# Patient Record
Sex: FEMALE | Race: WHITE | NOT HISPANIC OR LATINO | Employment: FULL TIME | URBAN - METROPOLITAN AREA
[De-identification: names, ages, dates, MRNs, and addresses within clinical notes are randomized per-mention and may not be internally consistent; named-entity substitution may affect disease eponyms.]

---

## 2017-04-24 ENCOUNTER — TELEPHONE (OUTPATIENT)
Dept: OBSTETRICS AND GYNECOLOGY | Age: 32
End: 2017-04-24

## 2017-04-24 NOTE — TELEPHONE ENCOUNTER
Pt with painful menses, she is wanting treatment. Her maternal aunt had DVT and pt had migraines so would avoid estrogen containing ocps.but progesterone only medications would be acceptable. Patient's sister has been happy with Nexplanon, pt does not want IUD. Patient also wants contraception. Discussed options and pt desires Nexplanon. She will call in am to get prior auth and schedule placement.

## 2017-08-18 ENCOUNTER — OFFICE VISIT (OUTPATIENT)
Dept: OBSTETRICS AND GYNECOLOGY | Age: 32
End: 2017-08-18

## 2017-08-18 VITALS
BODY MASS INDEX: 25.46 KG/M2 | WEIGHT: 168 LBS | HEIGHT: 68 IN | DIASTOLIC BLOOD PRESSURE: 80 MMHG | SYSTOLIC BLOOD PRESSURE: 110 MMHG

## 2017-08-18 DIAGNOSIS — Z30.017 NEXPLANON INSERTION: Primary | ICD-10-CM

## 2017-08-18 LAB
B-HCG UR QL: NEGATIVE
INTERNAL NEGATIVE CONTROL: NEGATIVE
INTERNAL POSITIVE CONTROL: POSITIVE
Lab: NORMAL

## 2017-08-18 PROCEDURE — 81025 URINE PREGNANCY TEST: CPT | Performed by: NURSE PRACTITIONER

## 2017-08-18 PROCEDURE — 11981 INSERTION DRUG DLVR IMPLANT: CPT | Performed by: NURSE PRACTITIONER

## 2017-08-18 RX ORDER — ETONOGESTREL 68 MG/1
IMPLANT SUBCUTANEOUS
COMMUNITY
Start: 2017-08-03 | End: 2018-03-28 | Stop reason: ALTCHOICE

## 2017-08-18 RX ORDER — LORAZEPAM 1 MG/1
TABLET ORAL
COMMUNITY
Start: 2017-07-01 | End: 2018-03-28 | Stop reason: ALTCHOICE

## 2017-08-18 NOTE — PROGRESS NOTES
Nexplanon Insertion    Patient's last menstrual period was 08/16/2017.  Patient here for nexplanon insertion.  She has a history of migraines and needs a progesterone only birth control.  R/B/A discussed and bleeding patterns and potential mood changes reviewed. She denies any unprotected IC or chance of pregnancy.    Date of procedure:  8/18/2017    Risks and benefits discussed? yes  All questions answered? yes  Consents given by the patient  Written consent obtained? yes    Local anesthesia used:  yes    Procedure documentation:     Urine pregnancy test was done today and result was negative.  The risks (including infection,  bruising, irregular bleeding, pain at insertion site, and injury to muscles, nerves  and blood vessesl) and benefits of the procedure were explained to the patient  and/or guardian and Written informed consent was obtained. She especially  understands that her menstrual periods are expected to become irregular and  unpredictable throughout the time she is using the implant.  She has no  contraindications to the insertion.  Her questions have been answered.  She has  fully reviewed the FDA-approved consent brochure, has signed the consent  form, and wishes to proceed with the insertion today.     The upper left arm (non-dominant) was marked at the intended site of insertion.  Betadine was used to cleanse the skin.  1 % lidocaine without epinephrine was injected.  The Nexplanon was placed subdermally without difficulty and according to manufacturers instructions.  The device was able to be palpated in the arm by both myself and the patient.  Steri-strips were then placed across the site of insertion and a pressure bandage was applied.    She tolerated the procedure well.  There were no complications.  EBL was minimal.    Post procedure instructions:          The patient was advised to call for any rash, arm pain, fever, warmth or for prolonged bruising or bleeding. Remove the wrapping in 24  hours and the steri-strips in 5 days.    Follow up needed: PRN.  Discussed bleeding patterns and what to expect.  She will use back up x 1 month.

## 2017-08-19 NOTE — PROGRESS NOTES
I have reviewed the notes, assessments, and/or procedures performed by DALY ROBLES, I concur with her/his documentation of Radha Ferrer.

## 2018-02-01 ENCOUNTER — OFFICE VISIT (OUTPATIENT)
Dept: OBSTETRICS AND GYNECOLOGY | Age: 33
End: 2018-02-01

## 2018-02-01 VITALS
HEIGHT: 68 IN | SYSTOLIC BLOOD PRESSURE: 120 MMHG | DIASTOLIC BLOOD PRESSURE: 80 MMHG | WEIGHT: 180 LBS | BODY MASS INDEX: 27.28 KG/M2

## 2018-02-01 DIAGNOSIS — Z30.46 NEXPLANON REMOVAL: Primary | ICD-10-CM

## 2018-02-01 PROCEDURE — 11982 REMOVE DRUG IMPLANT DEVICE: CPT | Performed by: NURSE PRACTITIONER

## 2018-02-01 NOTE — PROGRESS NOTES
Nexplanon Removal    Date of procedure:  2/1/2018 Patient here for nexplanon removal.  She has noticed a big difference in her depression since insertion and she wants it removed. She has a doctor that manages her antidepressants and she sees a counselor. She has not changed anything else.  She has not been sexually active in over a year and denies the need for additional birth control at this time.  Her daughter is doing very well despite her heart defect!    Risks and benefits discussed? yes  All questions answered? yes  Consents given by the patient  Written consent obtained? yes    Local anesthesia used:  yes - 2 cc's of  Meds; anesthesia local: 1% lidocaine with epinephrine    Procedure documentation:    The upper left arm (non-dominant) was marked at the intended site of removal.  Betadine was used to cleanse the skin.  Local anesthesia was injected.  A vertical incision was created at the distal tip of the implant.  The implant was removed intact without difficulty.  Steri-strips were then placed across the site of insertion and the arm was wrapped.    She tolerated the procedure well.  There were no complications.  EBL was minimal.    Post procedure instructions: Remove the wrapping in 24 hours and the steri-strips in 5 days.    Follow up needed: PRN and AE.  Advised condoms if sexually active.

## 2018-02-04 NOTE — PROGRESS NOTES
I have reviewed the notes, assessments, and/or procedures performed by MARGARET Stewart, I concur with her/his documentation of Radha Ferrer.

## 2018-02-28 ENCOUNTER — OFFICE VISIT (OUTPATIENT)
Dept: OBSTETRICS AND GYNECOLOGY | Age: 33
End: 2018-02-28

## 2018-02-28 VITALS
WEIGHT: 183 LBS | HEIGHT: 65 IN | BODY MASS INDEX: 30.49 KG/M2 | SYSTOLIC BLOOD PRESSURE: 106 MMHG | DIASTOLIC BLOOD PRESSURE: 66 MMHG

## 2018-02-28 DIAGNOSIS — Z01.419 VISIT FOR GYNECOLOGIC EXAMINATION: Primary | ICD-10-CM

## 2018-02-28 DIAGNOSIS — Z86.010 HISTORY OF COLON POLYPS: ICD-10-CM

## 2018-02-28 DIAGNOSIS — Z11.51 SCREENING FOR HPV (HUMAN PAPILLOMAVIRUS): ICD-10-CM

## 2018-02-28 DIAGNOSIS — Z86.32 HISTORY OF GESTATIONAL DIABETES MELLITUS (GDM): ICD-10-CM

## 2018-02-28 DIAGNOSIS — Z13.29 SCREENING FOR THYROID DISORDER: ICD-10-CM

## 2018-02-28 DIAGNOSIS — R00.0 TACHYCARDIA: ICD-10-CM

## 2018-02-28 LAB
HBA1C MFR BLD: 4.9 % (ref 4.8–5.6)
T4 FREE SERPL-MCNC: 0.96 NG/DL (ref 0.93–1.7)
TSH SERPL DL<=0.005 MIU/L-ACNC: 1.46 MIU/ML (ref 0.27–4.2)

## 2018-02-28 PROCEDURE — 99395 PREV VISIT EST AGE 18-39: CPT | Performed by: OBSTETRICS & GYNECOLOGY

## 2018-02-28 NOTE — PROGRESS NOTES
Subjective     Chief Complaint   Patient presents with   • Annual Exam       History of Present Illness    Radha Ferrer is a 32 y.o.  who presents for annual exam.  Pt has not had menses since Nexplanon removed. She had it removed for weight gain and irregular bleeding  She and spouse are not sexually active. This has been chronic for them; they have not been active since her daughter was born. They are having issues and she is seeing a therapist.  Pt has noted an increase in heart rate. Her baseline rate has been in the 90's but up to 160 at times. She would like her thyroid tested.  Her daughter is doing well since her heart surgery   Obstetric History:  OB History      Para Term  AB Living    1 1 1 0 0 1    SAB TAB Ectopic Multiple Live Births    0 0 0 0          Menstrual History:     No LMP recorded. Patient has had an implant.         Current contraception: abstinence  History of abnormal Pap smear: no  Received Gardasil immunization: no  Perform regular self breast exam: yes - occ  Family history of uterine or ovarian cancer: no  Family History of colon cancer: yes - mat great uncleX 2  Family history of breast cancer: yes - mat great gm    Mammogram: not indicated.  Colonoscopy: recommended.  DEXA: not indicated.    Exercise: moderately active  Calcium/Vitamin D: adequate intake    The following portions of the patient's history were reviewed and updated as appropriate: allergies, current medications, past family history, past medical history, past social history, past surgical history and problem list.    Review of Systems    Review of Systems   Constitutional: Negative for fatigue.   Respiratory: Negative for shortness of breath.    Gastrointestinal: Negative for abdominal pain.   Genitourinary: Negative for dysuria. positive for abnormal bleeding on nexplanon  Neurological: Negative for headaches.   Psychiatric/Behavioral: Negative for dysphoric mood.  Cardiovascular: positive for  "tachycardia         Objective   Physical Exam    /66  Ht 165.1 cm (65\")  Wt 83 kg (183 lb)  BMI 30.45 kg/m2    General:   alert, appears stated age, cooperative and no distress   Neck: no asymmetry, masses, or scars and thyroid normal to palpation   Heart: regular rate and rhythm, S1, S2 normal, no murmur, click, rub or gallop   Lungs: clear to auscultation bilaterally   Abdomen: soft, non-tender, without masses or organomegaly   Breast: inspection negative, no nipple discharge or bleeding, no masses or nodularity palpable and no axillary adenopathy   Vulva: normal, Bartholin's, Urethra, [place]'s normal   Vagina: normal mucosa, normal discharge   Cervix: no lesions and pap done   Uterus: normal size, mobile, non-tender, normal shape and consistency   Adnexa: normal adnexa and no mass, fullness, tenderness   Rectal: not indicated     Assessment/Plan   Radha was seen today for annual exam.    Diagnoses and all orders for this visit:    Visit for gynecologic examination  -     IGP, Apt HPV,rfx 16 / 18,45 - ThinPrep Vial, Cervix    History of colon polyps  -     Ambulatory Referral to Gastroenterology    Tachycardia  -     TSH  -     T4, Free  -     Ambulatory Referral to Cardiology    Screening for thyroid disorder  -     TSH  -     T4, Free    Screening for HPV (human papillomavirus)  -     IGP, Apt HPV,rfx 16 / 18,45 - ThinPrep Vial, Cervix    History of gestational diabetes mellitus (GDM)  -     Hemoglobin A1c        All questions answered.  Breast self exam technique reviewed and patient encouraged to perform self-exam monthly.  Discussed healthy lifestyle modifications.  Recommended 30 minutes of aerobic exercise five times per week.    Recommend GI referral due to hx colon polyps, also referred to cardiology due to increased heart rate    Pt has not had menses since having Nexplanon removed last month, plan observe, pt not sexually active and does not need contraception, will follow, plan f/u visit 6 " weeks

## 2018-03-05 ENCOUNTER — TELEPHONE (OUTPATIENT)
Dept: OBSTETRICS AND GYNECOLOGY | Age: 33
End: 2018-03-05

## 2018-03-05 LAB
CYTOLOGIST CVX/VAG CYTO: NORMAL
CYTOLOGY CVX/VAG DOC THIN PREP: NORMAL
DX ICD CODE: NORMAL
HIV 1 & 2 AB SER-IMP: NORMAL
HPV I/H RISK 4 DNA CVX QL PROBE+SIG AMP: NEGATIVE
OTHER STN SPEC: NORMAL
PATH REPORT.FINAL DX SPEC: NORMAL
STAT OF ADQ CVX/VAG CYTO-IMP: NORMAL

## 2018-03-05 NOTE — TELEPHONE ENCOUNTER
----- Message from Mya Lovelace MD sent at 3/2/2018 11:45 AM EST -----  Call Radha, her thyroid labs and hgbA1c are normal

## 2018-03-05 NOTE — TELEPHONE ENCOUNTER
----- Message from Mya Lovelace MD sent at 3/5/2018  9:01 AM EST -----  Call Radha, pap and hpv are negative

## 2018-03-28 ENCOUNTER — OFFICE VISIT (OUTPATIENT)
Dept: CARDIOLOGY | Facility: CLINIC | Age: 33
End: 2018-03-28

## 2018-03-28 VITALS
HEIGHT: 69 IN | HEART RATE: 95 BPM | DIASTOLIC BLOOD PRESSURE: 88 MMHG | WEIGHT: 176 LBS | BODY MASS INDEX: 26.07 KG/M2 | SYSTOLIC BLOOD PRESSURE: 138 MMHG

## 2018-03-28 DIAGNOSIS — R00.2 PALPITATIONS: Primary | ICD-10-CM

## 2018-03-28 PROCEDURE — 99204 OFFICE O/P NEW MOD 45 MIN: CPT | Performed by: INTERNAL MEDICINE

## 2018-03-28 PROCEDURE — 93000 ELECTROCARDIOGRAM COMPLETE: CPT | Performed by: INTERNAL MEDICINE

## 2018-03-28 NOTE — PROGRESS NOTES
Subjective:     Encounter Date:03/28/2018      Patient ID: Radha Ferrer is a 32 y.o. female.    Chief Complaint:  History of Present Illness    This is a 32-year-old with a history of anxiety who presents for an evaluation of palpitations.    The patient reports that she's been having issues with palpitations for the last couple years but they have been a little bit worse in frequency recently.  She describes it as feeling like her heart suddenly starts racing at rest.  The symptoms came back arteries have an the day.  He does not generally occur with exertion or activity.  Symptoms can last several minutes.  The episodes are associated with shortness of breath, lightheadedness, and some chest discomfort.  She exercises on a pre-regular basis and does not note any of the symptoms with exercise.  She has she notes on her feet that that her heart rate will rise up to the 140s to 160s during these episodes.  She reports that the palpitations usually start suddenly and stop suddenly.  The episodes do occur on a daily basis.  She has been under a great deal of stress over the last few years especially over the last couple years.  Her daughter who is now 2 years old was born with Ebstein anomaly and had to undergo surgery within the first year of her life.  She was quite sick prior to the surgery requiring placement on ECMO.  Fortunately she has been doing well recently.  Additionally the patient reports that she and her  are going through a divorce, although amicable has been difficult.  She knows that she has issues with anxiety and does take alprazolam as needed and is followed by psychiatrist.  She is well aware that her symptoms could be due to her anxiety but wants to be sure there is not a more concerning cardiac issue before she focuses on stress management.    Review of Systems   Constitution: Negative for weakness and malaise/fatigue.   HENT: Negative for hearing loss, hoarse voice, nosebleeds and  sore throat.    Eyes: Negative for pain.   Cardiovascular: Positive for palpitations. Negative for chest pain, claudication, cyanosis, dyspnea on exertion, irregular heartbeat, leg swelling, near-syncope, orthopnea, paroxysmal nocturnal dyspnea and syncope.   Respiratory: Positive for shortness of breath. Negative for snoring.    Endocrine: Negative for cold intolerance, heat intolerance, polydipsia, polyphagia and polyuria.   Skin: Negative for itching and rash.   Musculoskeletal: Negative for arthritis, falls, joint pain, joint swelling, muscle cramps, muscle weakness and myalgias.   Gastrointestinal: Negative for constipation, diarrhea, dysphagia, heartburn, hematemesis, hematochezia, melena, nausea and vomiting.   Genitourinary: Negative for frequency, hematuria and hesitancy.   Neurological: Negative for excessive daytime sleepiness, dizziness, headaches, light-headedness and numbness.   Psychiatric/Behavioral: Negative for depression. The patient is nervous/anxious.           Current Outpatient Prescriptions:   •  ALPRAZolam (XANAX) 1 MG tablet, Take 1 mg by mouth 2 (Two) Times a Day As Needed for anxiety., Disp: , Rfl:   •  DULoxetine (CYMBALTA) 60 MG capsule, Take 60 mg by mouth Daily., Disp: , Rfl:     Past Medical History:   Diagnosis Date   • Anxiety    • Colon polyps    • Depression    • Diabetes    • GERD (gastroesophageal reflux disease)    • Gestational diabetes    • Kidney stones    • Multiple gastric ulcers    • Nipple discharge    • Tachycardia      Past Surgical History:   Procedure Laterality Date   •  SECTION     • CHOLECYSTECTOMY     • COLONOSCOPY W/ POLYPECTOMY      precancerous polyps     Family History   Problem Relation Age of Onset   • Brain cancer Mother    • Birth defects Daughter    • Colon cancer Other    • Breast cancer Other    • Colon cancer Other      Social History   Substance Use Topics   • Smoking status: Never Smoker   • Smokeless tobacco: Never Used   • Alcohol use 1.2  "oz/week     2 Cans of beer per week      Comment: social           ECG 12 Lead  Date/Time: 3/28/2018 5:09 PM  Performed by: VITALY HUNG  Authorized by: VITALY HUNG   Comparison: compared with previous ECG   Similar to previous ECG  Rhythm: sinus rhythm               Objective:         Visit Vitals  /88 (BP Location: Right arm, Patient Position: Sitting) Comment: lft 140 90   Pulse 95   Ht 175.3 cm (69\")   Wt 79.8 kg (176 lb)   BMI 25.99 kg/m²          Physical Exam   Constitutional: She is oriented to person, place, and time. She appears well-developed and well-nourished.   HENT:   Head: Normocephalic and atraumatic.   Eyes: Conjunctivae, EOM and lids are normal. Pupils are equal, round, and reactive to light.   Neck: Normal range of motion and full passive range of motion without pain. Neck supple. No JVD present. Carotid bruit is not present.   Cardiovascular: Normal rate, regular rhythm, S1 normal and S2 normal.  Exam reveals no gallop.    No murmur heard.  Pulses:       Radial pulses are 2+ on the right side, and 2+ on the left side.   No bilateral lower extremity edema   Pulmonary/Chest: Effort normal and breath sounds normal.   Abdominal: Soft. Normal appearance.   Lymphadenopathy:     She has no cervical adenopathy.   Neurological: She is alert and oriented to person, place, and time.   Skin: Skin is warm, dry and intact.   Psychiatric: She has a normal mood and affect.       Lab Review:       Assessment:          Diagnosis Plan   1. Palpitations  Holter Monitor - 24 Hour    Adult Transthoracic Echo Complete W/ Cont if Necessary Per Protocol          Plan:       1.  Palpitations.  Her symptoms could be simply sinus tachycardia although it is also suggestive of supraventricular tachycardia.  We'll go and get her set up with a 24-hour Holter monitor.  Additionally we'll pursue an echocardiogram to evaluate for any structural heart disease.  2.  Anxiety    Will call and discuss the results of the " echocardiogram and Holter monitor and determine further workup, management, and follow-up based on those results.

## 2018-03-30 ENCOUNTER — AMBULATORY SURGICAL CENTER (OUTPATIENT)
Dept: URBAN - METROPOLITAN AREA SURGERY 20 | Facility: SURGERY | Age: 33
End: 2018-03-30

## 2018-03-30 ENCOUNTER — OFFICE (OUTPATIENT)
Dept: URBAN - METROPOLITAN AREA PATHOLOGY 4 | Facility: PATHOLOGY | Age: 33
End: 2018-03-30

## 2018-03-30 DIAGNOSIS — K29.50 UNSPECIFIED CHRONIC GASTRITIS WITHOUT BLEEDING: ICD-10-CM

## 2018-03-30 DIAGNOSIS — D12.6 BENIGN NEOPLASM OF COLON, UNSPECIFIED: ICD-10-CM

## 2018-03-30 DIAGNOSIS — R19.7 DIARRHEA, UNSPECIFIED: ICD-10-CM

## 2018-03-30 DIAGNOSIS — K21.9 GASTRO-ESOPHAGEAL REFLUX DISEASE WITHOUT ESOPHAGITIS: ICD-10-CM

## 2018-03-30 DIAGNOSIS — D12.8 BENIGN NEOPLASM OF RECTUM: ICD-10-CM

## 2018-03-30 DIAGNOSIS — D12.0 BENIGN NEOPLASM OF CECUM: ICD-10-CM

## 2018-03-30 DIAGNOSIS — K29.70 GASTRITIS, UNSPECIFIED, WITHOUT BLEEDING: ICD-10-CM

## 2018-03-30 DIAGNOSIS — Z86.010 PERSONAL HISTORY OF COLONIC POLYPS: ICD-10-CM

## 2018-03-30 DIAGNOSIS — K31.89 OTHER DISEASES OF STOMACH AND DUODENUM: ICD-10-CM

## 2018-03-30 PROBLEM — K62.1 RECTAL POLYP: Status: ACTIVE | Noted: 2018-03-30

## 2018-03-30 LAB
GI HISTOLOGY: A. SELECT: (no result)
GI HISTOLOGY: B. SELECT: (no result)
GI HISTOLOGY: C. SELECT: (no result)
GI HISTOLOGY: D. SELECT: (no result)
GI HISTOLOGY: E. SELECT: (no result)
GI HISTOLOGY: PDF REPORT: (no result)

## 2018-03-30 PROCEDURE — 45380 COLONOSCOPY AND BIOPSY: CPT | Mod: 33 | Performed by: INTERNAL MEDICINE

## 2018-03-30 PROCEDURE — 43239 EGD BIOPSY SINGLE/MULTIPLE: CPT | Mod: 59 | Performed by: INTERNAL MEDICINE

## 2018-03-30 PROCEDURE — 88305 TISSUE EXAM BY PATHOLOGIST: CPT | Performed by: INTERNAL MEDICINE

## 2018-04-05 ENCOUNTER — HOSPITAL ENCOUNTER (OUTPATIENT)
Dept: CARDIOLOGY | Facility: HOSPITAL | Age: 33
Discharge: HOME | End: 2018-04-05
Attending: INTERNAL MEDICINE | Admitting: INTERNAL MEDICINE

## 2018-04-05 VITALS
DIASTOLIC BLOOD PRESSURE: 90 MMHG | WEIGHT: 176 LBS | HEART RATE: 88 BPM | HEIGHT: 69 IN | BODY MASS INDEX: 26.07 KG/M2 | SYSTOLIC BLOOD PRESSURE: 138 MMHG

## 2018-04-05 LAB
ASCENDING AORTA: 2.8 CM
BH CV ECHO MEAS - ACS: 1.9 CM
BH CV ECHO MEAS - AO MAX PG (FULL): 2.4 MMHG
BH CV ECHO MEAS - AO MAX PG: 6.7 MMHG
BH CV ECHO MEAS - AO MEAN PG (FULL): 1 MMHG
BH CV ECHO MEAS - AO MEAN PG: 3 MMHG
BH CV ECHO MEAS - AO ROOT AREA (BSA CORRECTED): 1.5
BH CV ECHO MEAS - AO ROOT AREA: 6.6 CM^2
BH CV ECHO MEAS - AO ROOT DIAM: 2.9 CM
BH CV ECHO MEAS - AO V2 MAX: 129 CM/SEC
BH CV ECHO MEAS - AO V2 MEAN: 76 CM/SEC
BH CV ECHO MEAS - AO V2 VTI: 24.8 CM
BH CV ECHO MEAS - ASC AORTA: 2.8 CM
BH CV ECHO MEAS - AVA(I,A): 1.8 CM^2
BH CV ECHO MEAS - AVA(I,D): 1.8 CM^2
BH CV ECHO MEAS - AVA(V,A): 2.2 CM^2
BH CV ECHO MEAS - AVA(V,D): 2.2 CM^2
BH CV ECHO MEAS - BSA(HAYCOCK): 2 M^2
BH CV ECHO MEAS - BSA: 2 M^2
BH CV ECHO MEAS - BZI_BMI: 26 KILOGRAMS/M^2
BH CV ECHO MEAS - BZI_METRIC_HEIGHT: 175.3 CM
BH CV ECHO MEAS - BZI_METRIC_WEIGHT: 79.8 KG
BH CV ECHO MEAS - CONTRAST EF (2CH): 63.1 ML/M^2
BH CV ECHO MEAS - CONTRAST EF 4CH: 58.3 ML/M^2
BH CV ECHO MEAS - EDV(MOD-SP2): 65 ML
BH CV ECHO MEAS - EDV(MOD-SP4): 72 ML
BH CV ECHO MEAS - EDV(TEICH): 78 ML
BH CV ECHO MEAS - EF(CUBED): 83.6 %
BH CV ECHO MEAS - EF(MOD-SP2): 63.1 %
BH CV ECHO MEAS - EF(MOD-SP4): 58.3 %
BH CV ECHO MEAS - EF(TEICH): 77 %
BH CV ECHO MEAS - ESV(MOD-SP2): 24 ML
BH CV ECHO MEAS - ESV(MOD-SP4): 30 ML
BH CV ECHO MEAS - ESV(TEICH): 17.9 ML
BH CV ECHO MEAS - FS: 45.3 %
BH CV ECHO MEAS - IVS/LVPW: 0.96
BH CV ECHO MEAS - IVSD: 0.86 CM
BH CV ECHO MEAS - LAT PEAK E' VEL: 12 CM/SEC
BH CV ECHO MEAS - LV DIASTOLIC VOL/BSA (35-75): 36.8 ML/M^2
BH CV ECHO MEAS - LV MASS(C)D: 114 GRAMS
BH CV ECHO MEAS - LV MASS(C)DI: 58.3 GRAMS/M^2
BH CV ECHO MEAS - LV MAX PG: 4.2 MMHG
BH CV ECHO MEAS - LV MEAN PG: 2 MMHG
BH CV ECHO MEAS - LV SYSTOLIC VOL/BSA (12-30): 15.3 ML/M^2
BH CV ECHO MEAS - LV V1 MAX: 103 CM/SEC
BH CV ECHO MEAS - LV V1 MEAN: 56.6 CM/SEC
BH CV ECHO MEAS - LV V1 VTI: 16.5 CM
BH CV ECHO MEAS - LVIDD: 4.2 CM
BH CV ECHO MEAS - LVIDS: 2.3 CM
BH CV ECHO MEAS - LVLD AP2: 7.6 CM
BH CV ECHO MEAS - LVLD AP4: 7.6 CM
BH CV ECHO MEAS - LVLS AP2: 5.9 CM
BH CV ECHO MEAS - LVLS AP4: 6.9 CM
BH CV ECHO MEAS - LVOT AREA (M): 2.8 CM^2
BH CV ECHO MEAS - LVOT AREA: 2.8 CM^2
BH CV ECHO MEAS - LVOT DIAM: 1.9 CM
BH CV ECHO MEAS - LVPWD: 0.89 CM
BH CV ECHO MEAS - MED PEAK E' VEL: 8 CM/SEC
BH CV ECHO MEAS - MV A DUR: 0.1 SEC
BH CV ECHO MEAS - MV A MAX VEL: 71.6 CM/SEC
BH CV ECHO MEAS - MV DEC SLOPE: 521.5 CM/SEC^2
BH CV ECHO MEAS - MV DEC TIME: 0.19 SEC
BH CV ECHO MEAS - MV E MAX VEL: 81.4 CM/SEC
BH CV ECHO MEAS - MV E/A: 1.1
BH CV ECHO MEAS - MV MAX PG: 5.6 MMHG
BH CV ECHO MEAS - MV MEAN PG: 2.7 MMHG
BH CV ECHO MEAS - MV P1/2T MAX VEL: 83.2 CM/SEC
BH CV ECHO MEAS - MV P1/2T: 46.8 MSEC
BH CV ECHO MEAS - MV V2 MAX: 118.1 CM/SEC
BH CV ECHO MEAS - MV V2 MEAN: 76.1 CM/SEC
BH CV ECHO MEAS - MV V2 VTI: 24.4 CM
BH CV ECHO MEAS - MVA P1/2T LCG: 2.6 CM^2
BH CV ECHO MEAS - MVA(P1/2T): 4.7 CM^2
BH CV ECHO MEAS - MVA(VTI): 1.9 CM^2
BH CV ECHO MEAS - PA ACC TIME: 0.11 SEC
BH CV ECHO MEAS - PA MAX PG (FULL): 1.5 MMHG
BH CV ECHO MEAS - PA MAX PG: 4 MMHG
BH CV ECHO MEAS - PA PR(ACCEL): 31.5 MMHG
BH CV ECHO MEAS - PA V2 MAX: 100.4 CM/SEC
BH CV ECHO MEAS - PULM A REVS DUR: 0.09 SEC
BH CV ECHO MEAS - PULM A REVS VEL: 32.8 CM/SEC
BH CV ECHO MEAS - PULM DIAS VEL: 36.6 CM/SEC
BH CV ECHO MEAS - PULM S/D: 1.6
BH CV ECHO MEAS - PULM SYS VEL: 56.9 CM/SEC
BH CV ECHO MEAS - PVA(V,A): 1.9 CM^2
BH CV ECHO MEAS - PVA(V,D): 1.9 CM^2
BH CV ECHO MEAS - QP/QS: 0.84
BH CV ECHO MEAS - RAP SYSTOLE: 3 MMHG
BH CV ECHO MEAS - RV MAX PG: 2.6 MMHG
BH CV ECHO MEAS - RV MEAN PG: 1.8 MMHG
BH CV ECHO MEAS - RV V1 MAX: 80.2 CM/SEC
BH CV ECHO MEAS - RV V1 MEAN: 62.7 CM/SEC
BH CV ECHO MEAS - RV V1 VTI: 15.7 CM
BH CV ECHO MEAS - RVOT AREA: 2.4 CM^2
BH CV ECHO MEAS - RVOT DIAM: 1.8 CM
BH CV ECHO MEAS - RVSP: 20 MMHG
BH CV ECHO MEAS - SI(AO): 83.5 ML/M^2
BH CV ECHO MEAS - SI(CUBED): 31.4 ML/M^2
BH CV ECHO MEAS - SI(LVOT): 23.4 ML/M^2
BH CV ECHO MEAS - SI(MOD-SP2): 21 ML/M^2
BH CV ECHO MEAS - SI(MOD-SP4): 21.5 ML/M^2
BH CV ECHO MEAS - SI(TEICH): 30.7 ML/M^2
BH CV ECHO MEAS - SUP REN AO DIAM: 1.5 CM
BH CV ECHO MEAS - SV(AO): 163.4 ML
BH CV ECHO MEAS - SV(CUBED): 61.4 ML
BH CV ECHO MEAS - SV(LVOT): 45.7 ML
BH CV ECHO MEAS - SV(MOD-SP2): 41 ML
BH CV ECHO MEAS - SV(MOD-SP4): 42 ML
BH CV ECHO MEAS - SV(RVOT): 38.2 ML
BH CV ECHO MEAS - SV(TEICH): 60.1 ML
BH CV ECHO MEAS - TAPSE (>1.6): 2.5 CM2
BH CV ECHO MEAS - TR MAX VEL: 208 CM/SEC
BH CV XLRA - RV BASE: 2.6 CM
BH CV XLRA - TDI S': 2.8 CM/SEC
E/E' RATIO: 9
LEFT ATRIUM VOLUME INDEX: 13 ML/M2
SINUS: 2.9 CM
STJ: 2.6 CM

## 2018-04-05 PROCEDURE — 93306 TTE W/DOPPLER COMPLETE: CPT

## 2018-04-05 PROCEDURE — 93306 TTE W/DOPPLER COMPLETE: CPT | Performed by: INTERNAL MEDICINE

## 2018-10-15 ENCOUNTER — OFFICE VISIT (OUTPATIENT)
Dept: OBSTETRICS AND GYNECOLOGY | Age: 33
End: 2018-10-15

## 2018-10-15 VITALS
BODY MASS INDEX: 25.56 KG/M2 | HEIGHT: 69 IN | DIASTOLIC BLOOD PRESSURE: 90 MMHG | WEIGHT: 172.6 LBS | SYSTOLIC BLOOD PRESSURE: 140 MMHG

## 2018-10-15 DIAGNOSIS — B00.9 HERPES: ICD-10-CM

## 2018-10-15 DIAGNOSIS — Z11.3 SCREENING EXAMINATION FOR STD (SEXUALLY TRANSMITTED DISEASE): Primary | ICD-10-CM

## 2018-10-15 PROCEDURE — 99213 OFFICE O/P EST LOW 20 MIN: CPT | Performed by: OBSTETRICS & GYNECOLOGY

## 2018-10-15 RX ORDER — SULFAMETHOXAZOLE AND TRIMETHOPRIM 800; 160 MG/1; MG/1
1 TABLET ORAL 2 TIMES DAILY
COMMUNITY
End: 2019-02-22

## 2018-10-15 RX ORDER — VALACYCLOVIR HYDROCHLORIDE 500 MG/1
500 TABLET, FILM COATED ORAL DAILY
Qty: 30 TABLET | Refills: 11 | Status: SHIPPED | OUTPATIENT
Start: 2018-10-15 | End: 2018-11-14

## 2018-10-15 NOTE — PROGRESS NOTES
"Chief complaint: STD screening    HPI  Radha Ferrer is a 33 y.o. female has had painful bumps that came out on her vulva last week. She was also dx with kidney infection at this same time and was treated through the ER. She is finishing her course of antibiotics. The bumps came up following dx of pyelonephritis. The lesions are already resolving.She has had new partner and used condoms but is considered about exposure. He had a recent other partner within the last year.        The following portions of the patient's history were reviewed and updated as appropriate: allergies, current medications, past family history, past medical history, past social history, past surgical history and problem list.    Review of Systems  Constitutional: negative  Genitourinary:recent flank pain and vulvar lesions  Behavioral/Psych: positive for anxiety    /90   Ht 175.3 cm (69\")   Wt 78.3 kg (172 lb 9.6 oz)   LMP 09/24/2018 (Exact Date)   BMI 25.49 kg/m²         Physical Exam   Constitutional: She is oriented to person, place, and time. She appears well-developed and well-nourished.   Cardiovascular: Normal rate and regular rhythm.    Pulmonary/Chest: Effort normal.   Abdominal: Soft. There is no tenderness. There is no guarding.   Genitourinary:   Genitourinary Comments: Right vulva with a few scattered pinpoint papules/ulercerative lesions, no surrounding erythema or induration; vagina/cervix without lesions or tenderness   Neurological: She is alert and oriented to person, place, and time.   Skin: Skin is warm and dry.   Psychiatric: She has a normal mood and affect. Her behavior is normal. Thought content normal.           Radha was seen today for gynecologic exam.    Diagnoses and all orders for this visit:    Screening examination for STD (sexually transmitted disease)  -     NuSwab VG+ & HSV    suspect HSV and labs done. Pt also wants full STD screen. She is interested in contraception as well. Discussed " options. Pt wants to start valtrex suppression as she believes she has HSV. As lesions are resolving, will start suppression dose rather than treatment and pt agrees. Counseled that recurrence dosing is 500 mg twice daily for 3 days.     rtc 2 weeks, pt given information on contraception options.

## 2018-10-18 LAB
HBV SURFACE AG SERPL QL IA: NEGATIVE
HIV 1+2 AB+HIV1 P24 AG SERPL QL IA: NON REACTIVE
HSV1 IGG SER IA-ACNC: 1.73 INDEX (ref 0–0.9)
HSV1 IGM TITR SER IF: ABNORMAL TITER
HSV2 IGG SER IA-ACNC: <0.91 INDEX (ref 0–0.9)
HSV2 IGM TITR SER IF: ABNORMAL TITER
RPR SER QL: NORMAL

## 2018-10-19 ENCOUNTER — TELEPHONE (OUTPATIENT)
Dept: OBSTETRICS AND GYNECOLOGY | Age: 33
End: 2018-10-19

## 2018-10-19 LAB

## 2018-10-19 RX ORDER — METRONIDAZOLE 7.5 MG/G
GEL VAGINAL 2 TIMES DAILY
Qty: 1 TUBE | Refills: 0 | Status: SHIPPED | OUTPATIENT
Start: 2018-10-19 | End: 2019-02-22

## 2018-10-19 NOTE — TELEPHONE ENCOUNTER
Called pt and reviewed information. Will treat BV with metrogel after discussion with pt. She is aware that herpes testing was positive and discussed.

## 2019-02-18 ENCOUNTER — TELEPHONE (OUTPATIENT)
Dept: OBSTETRICS AND GYNECOLOGY | Age: 34
End: 2019-02-18

## 2019-02-18 NOTE — TELEPHONE ENCOUNTER
Dr Mckay pt for the past month 1/2 to 2 mos spotting every day, not sex active, 3 days ago noticed low pelvic pain and tender to touch, bloated, low back pain. Please advise, does pt need US as well as appt?

## 2019-02-22 ENCOUNTER — OFFICE VISIT (OUTPATIENT)
Dept: OBSTETRICS AND GYNECOLOGY | Age: 34
End: 2019-02-22

## 2019-02-22 ENCOUNTER — PROCEDURE VISIT (OUTPATIENT)
Dept: OBSTETRICS AND GYNECOLOGY | Age: 34
End: 2019-02-22

## 2019-02-22 VITALS
WEIGHT: 158 LBS | HEIGHT: 69 IN | SYSTOLIC BLOOD PRESSURE: 120 MMHG | BODY MASS INDEX: 23.4 KG/M2 | DIASTOLIC BLOOD PRESSURE: 80 MMHG

## 2019-02-22 DIAGNOSIS — N93.9 ABNORMAL UTERINE BLEEDING: Primary | ICD-10-CM

## 2019-02-22 DIAGNOSIS — N92.6 IRREGULAR MENSES: Primary | ICD-10-CM

## 2019-02-22 PROCEDURE — 58100 BIOPSY OF UTERUS LINING: CPT | Performed by: NURSE PRACTITIONER

## 2019-02-22 PROCEDURE — 76830 TRANSVAGINAL US NON-OB: CPT | Performed by: OBSTETRICS & GYNECOLOGY

## 2019-02-22 PROCEDURE — 81025 URINE PREGNANCY TEST: CPT | Performed by: NURSE PRACTITIONER

## 2019-02-22 PROCEDURE — 99214 OFFICE O/P EST MOD 30 MIN: CPT | Performed by: NURSE PRACTITIONER

## 2019-02-22 NOTE — PROGRESS NOTES
"Subjective   Radha Ferrer is a 33 y.o. female is being seen today for   Chief Complaint   Patient presents with   • Menstrual Problem     Pt c/o irregular periods. US today,    .    History of Present Illness     Patient here for irregular bleeding x 2 months.  She started her period when she was supposed in December but then it never stopped.  She reports that it has been heavy at times and sometimes just spotting but she saw bleeding daily for 2 months.  It did stop after she called to make the appointment and she is not currently bleeding.  Prior to that she was having regular, monthly cycles.  She is not on birth control and has discussed getting a kyleena but got new insurance this year and hasn't checked benefits.  She would like to recheck benefits and would still like the IUD.  She denies any thyroid disorders.  She does have a history of heavy cycles and has had irregular menses in the past but never found a cause.  She has a history of precancerous colon polyps removed.    The following portions of the patient's history were reviewed and updated as appropriate: allergies, current medications, past family history, past medical history, past social history, past surgical history and problem list.    /80   Ht 175.3 cm (69\")   Wt 71.7 kg (158 lb)   BMI 23.33 kg/m²         Review of Systems   Constitutional: Negative.    HENT: Negative.    Eyes: Negative.    Respiratory: Negative.    Cardiovascular: Negative.    Gastrointestinal: Negative.  Negative for abdominal pain.   Endocrine: Negative.    Genitourinary: Positive for menstrual problem and vaginal bleeding. Negative for vaginal discharge.   Musculoskeletal: Negative.    Skin: Negative.    Allergic/Immunologic: Negative.    Neurological: Negative.    Hematological: Negative.    Psychiatric/Behavioral: Negative.        Objective   Physical Exam   Constitutional: She is oriented to person, place, and time. She appears well-developed and " well-nourished.   Genitourinary: Vagina normal and uterus normal. Uterus is not tender. Cervix exhibits no motion tenderness, no discharge and no friability.   Neurological: She is alert and oriented to person, place, and time.   Skin: Skin is warm and dry.   Psychiatric: She has a normal mood and affect.     Endometrial Biopsy    Date of procedure:  2/22/2019    Procedure documentation:    The cervix was grasped anterior at the 12 o'clock position.  The cavity sounded to 7 centimeters.  An endometrial biopsy specimen was obtained and multiple passes.  The tissue was sent for permanent histopathologic evaluation.  Tenaculum was removed from the cervix with scant bleeding. Patient tolerated well.    Post procedure instructions: She was instructed to call us in 1 week's time if she has not heard from us otherwise.  If there is any significant fever or excessive bleeding or pain she is to call immediately.      Assessment/Plan   Radha was seen today for menstrual problem.    Diagnoses and all orders for this visit:    Abnormal uterine bleeding  -     TSH  -     CBC & Differential  -     Reference Histopathology  -     POC Pregnancy, Urine      Ultrasound done.  Normal uterus with endometrial lining of 10.48mm, normal ovaries.   Discussed EBX with patient and she would like to do that today to rule out endometrial cancer.  She is concerned about bleeding patterns. Endometrial Biopsy sent and if normal patient will plan to proceed with kyleena insertion with next menses.  Discussed no unprotected sex for 2 weeks prior to insertion.  TSH and CBC sent.  She will schedule her AE as well.

## 2019-02-23 LAB
BASOPHILS # BLD AUTO: 0.07 10*3/MM3 (ref 0–0.2)
BASOPHILS NFR BLD AUTO: 0.7 % (ref 0–1.5)
EOSINOPHIL # BLD AUTO: 0.14 10*3/MM3 (ref 0–0.4)
EOSINOPHIL NFR BLD AUTO: 1.4 % (ref 0.3–6.2)
ERYTHROCYTE [DISTWIDTH] IN BLOOD BY AUTOMATED COUNT: 12.9 % (ref 12.3–15.4)
HCT VFR BLD AUTO: 50.1 % (ref 34–46.6)
HGB BLD-MCNC: 15.5 G/DL (ref 12–15.9)
IMM GRANULOCYTES # BLD AUTO: 0.03 10*3/MM3 (ref 0–0.05)
IMM GRANULOCYTES NFR BLD AUTO: 0.3 % (ref 0–0.5)
LYMPHOCYTES # BLD AUTO: 2.49 10*3/MM3 (ref 0.7–3.1)
LYMPHOCYTES NFR BLD AUTO: 25.7 % (ref 19.6–45.3)
MCH RBC QN AUTO: 31 PG (ref 26.6–33)
MCHC RBC AUTO-ENTMCNC: 30.9 G/DL (ref 31.5–35.7)
MCV RBC AUTO: 100.2 FL (ref 79–97)
MONOCYTES # BLD AUTO: 0.58 10*3/MM3 (ref 0.1–0.9)
MONOCYTES NFR BLD AUTO: 6 % (ref 5–12)
NEUTROPHILS # BLD AUTO: 6.37 10*3/MM3 (ref 1.4–7)
NEUTROPHILS NFR BLD AUTO: 65.9 % (ref 42.7–76)
NRBC BLD AUTO-RTO: 0 /100 WBC (ref 0–0)
PLATELET # BLD AUTO: 329 10*3/MM3 (ref 140–450)
RBC # BLD AUTO: 5 10*6/MM3 (ref 3.77–5.28)
TSH SERPL DL<=0.005 MIU/L-ACNC: 1.11 MIU/ML (ref 0.27–4.2)
WBC # BLD AUTO: 9.68 10*3/MM3 (ref 3.4–10.8)

## 2019-02-27 LAB
PATH REPORT.FINAL DX SPEC: NORMAL
PATH REPORT.GROSS SPEC: NORMAL
PATH REPORT.SITE OF ORIGIN SPEC: NORMAL
PATHOLOGIST NAME: NORMAL
PAYMENT PROCEDURE: NORMAL

## 2019-03-01 DIAGNOSIS — N92.6 IRREGULAR BLEEDING: Primary | ICD-10-CM

## 2019-03-04 ENCOUNTER — TELEPHONE (OUTPATIENT)
Dept: OBSTETRICS AND GYNECOLOGY | Age: 34
End: 2019-03-04

## 2019-03-05 ENCOUNTER — OFFICE VISIT (OUTPATIENT)
Dept: OBSTETRICS AND GYNECOLOGY | Age: 34
End: 2019-03-05

## 2019-03-05 VITALS
HEIGHT: 69 IN | SYSTOLIC BLOOD PRESSURE: 126 MMHG | DIASTOLIC BLOOD PRESSURE: 78 MMHG | WEIGHT: 162 LBS | BODY MASS INDEX: 23.99 KG/M2

## 2019-03-05 DIAGNOSIS — D39.2 PLACENTAL SITE NODULE: ICD-10-CM

## 2019-03-05 DIAGNOSIS — N93.9 ABNORMAL UTERINE BLEEDING (AUB): Primary | ICD-10-CM

## 2019-03-05 LAB — HCG INTACT+B SERPL-ACNC: <0.5 MIU/ML

## 2019-03-05 PROCEDURE — 99213 OFFICE O/P EST LOW 20 MIN: CPT | Performed by: OBSTETRICS & GYNECOLOGY

## 2019-03-05 NOTE — PROGRESS NOTES
"Chief complaint: placental site nodule    HPI  Radha Ferrer is a 33 y.o. female comes for review of pathology. She notes she has not been sexually since 11/18. She reports menses were Q month with normal flow until mid January when she started having intermittent light bleeding that has been nearly daily. She reports mucous discharge as well. She did have an episode of pain in her left pelvis which prompted her to call for an appt. She was evaluated with u/s and embx last week. She reports recent increase in bleeding like menstrual flow. She denies any recent pregnancies/SAB's since delivery in 2015.        The following portions of the patient's history were reviewed and updated as appropriate: allergies, current medications, past family history, past medical history, past social history, past surgical history and problem list.    Review of Systems  Pertinent items are noted in HPI.    /78   Ht 175.3 cm (69\")   Wt 73.5 kg (162 lb)   LMP 02/28/2019 (Exact Date)   Breastfeeding? No   BMI 23.92 kg/m²         Physical Exam   Constitutional: She appears well-developed and well-nourished.   Psychiatric: She has a normal mood and affect. Her behavior is normal.     Embx: implantation site nodule, secretory endometrium, no atypia or malignancy      Radha was seen today for follow-up.    Diagnoses and all orders for this visit:    Abnormal uterine bleeding (AUB)    Placental site nodule    abnormal bleeding with implantation site nodule on pathology. Plan check serum HCG and discuss further management with gyn onc MD. Pt understands and agrees with plan.            "

## 2019-03-06 ENCOUNTER — DOCUMENTATION (OUTPATIENT)
Dept: OBSTETRICS AND GYNECOLOGY | Age: 34
End: 2019-03-06

## 2019-03-06 DIAGNOSIS — N93.9 ABNORMAL UTERINE BLEEDING: Primary | ICD-10-CM

## 2019-03-07 NOTE — PROGRESS NOTES
Called gyn onc MD, Dr. Payal Garzon, today and reviewed pt's pathology and clinical history. Reviewed recent serum HCG was negative. She recommends checking human placental lactogen and referring patient to her if this is elevated. If the level is normal, she advised the lesion is likely benign and I can proceed with hysteroscopy D&C for further evaluation/treatment.

## 2019-03-08 ENCOUNTER — TELEPHONE (OUTPATIENT)
Dept: OBSTETRICS AND GYNECOLOGY | Age: 34
End: 2019-03-08

## 2019-03-13 ENCOUNTER — TELEPHONE (OUTPATIENT)
Dept: OBSTETRICS AND GYNECOLOGY | Age: 34
End: 2019-03-13

## 2019-03-13 DIAGNOSIS — N93.9 ABNORMAL UTERINE BLEEDING: Primary | ICD-10-CM

## 2019-03-13 LAB — CHORIOMAMMOTROPIN SER-MCNC: 0.24 MCG/ML (ref 0–12.8)

## 2019-03-13 NOTE — TELEPHONE ENCOUNTER
Called to review results. No answer. Left message of call and that referral is being placed. Will leave message with phone room regarding levels.

## 2019-03-18 ENCOUNTER — TELEPHONE (OUTPATIENT)
Dept: OBSTETRICS AND GYNECOLOGY | Age: 34
End: 2019-03-18

## 2019-03-19 ENCOUNTER — TELEPHONE (OUTPATIENT)
Dept: OBSTETRICS AND GYNECOLOGY | Age: 34
End: 2019-03-19

## 2019-03-20 ENCOUNTER — TELEPHONE (OUTPATIENT)
Dept: OBSTETRICS AND GYNECOLOGY | Age: 34
End: 2019-03-20

## 2019-12-18 ENCOUNTER — OFFICE VISIT (OUTPATIENT)
Dept: OBSTETRICS AND GYNECOLOGY | Age: 34
End: 2019-12-18

## 2019-12-18 VITALS
SYSTOLIC BLOOD PRESSURE: 122 MMHG | HEIGHT: 69 IN | DIASTOLIC BLOOD PRESSURE: 78 MMHG | WEIGHT: 174.8 LBS | BODY MASS INDEX: 25.89 KG/M2

## 2019-12-18 DIAGNOSIS — Z11.3 SCREENING EXAMINATION FOR STD (SEXUALLY TRANSMITTED DISEASE): ICD-10-CM

## 2019-12-18 DIAGNOSIS — Z01.419 ENCOUNTER FOR GYNECOLOGICAL EXAMINATION: Primary | ICD-10-CM

## 2019-12-18 DIAGNOSIS — Z12.39 SCREENING FOR BREAST CANCER: ICD-10-CM

## 2019-12-18 PROBLEM — F41.9 ANXIETY: Status: ACTIVE | Noted: 2019-12-18

## 2019-12-18 PROCEDURE — 99395 PREV VISIT EST AGE 18-39: CPT | Performed by: OBSTETRICS & GYNECOLOGY

## 2019-12-18 RX ORDER — ALBUTEROL SULFATE 90 UG/1
AEROSOL, METERED RESPIRATORY (INHALATION)
COMMUNITY
Start: 2019-09-26 | End: 2021-03-18

## 2019-12-18 NOTE — PROGRESS NOTES
.  Subjective     Chief Complaint   Patient presents with   • Gynecologic Exam     AE  Last pap 18-, HPV-       History of Present Illness    Radha Ferrer is a 34 y.o.  who presents for annual exam.  Her menses are regular every 28-30 days, lasting 0-3 days, dysmenorrhea none   Pt denies any abnormal bleeding since she had her Kyleena placed by Dr. Garzon. She had hysteroscopy D&C for placental site nodule; Pathology was benign with no evidence of GTD.   She recently established with primary MD and had screening for diabetes that was normal.  She had 2 new partners earlier in year and would like STD screening  Her daughter is doing well with cardiac follow-up. No issues now. She and Carlos ( her ex- ) are getting along well and co-parenting without issues    Obstetric History:  OB History        1    Para   1    Term   1       0    AB   0    Living   1       SAB   0    TAB   0    Ectopic   0    Molar        Multiple   0    Live Births                   Menstrual History:     Patient's last menstrual period was 2019 (exact date).         Current contraception: IUD-placed within year by Dr. Garzon  History of abnormal Pap smear: no  Received Gardasil immunization: no  Perform regular self breast exam: yes - reg  Family history of uterine or ovarian cancer: no  Family History of colon cancer: mat great uncles X 2  Family history of breast cancer: yes - mat great gm    Mammogram: ordered.  Colonoscopy: up to date, last year, pt followed by GI MD for history of pre-cancerous polyps  DEXA: not indicated.    Exercise: moderately active  Calcium/Vitamin D: adequate intake    The following portions of the patient's history were reviewed and updated as appropriate: allergies, current medications, past family history, past medical history, past social history, past surgical history and problem list.    Review of Systems    Review of Systems   Constitutional: Negative for fatigue.  "  Respiratory: Negative for shortness of breath.    Gastrointestinal: Negative for abdominal pain.   Genitourinary: Negative for dysuria. negative for irregular bleeding since hysteroscopy, negative for pelvic pain  Neurological: Negative for headaches.   Psychiatric/Behavioral: Negative for dysphoric mood.         Objective   Physical Exam    /78   Ht 175.3 cm (69\")   Wt 79.3 kg (174 lb 12.8 oz)   LMP 12/12/2019 (Exact Date)   Breastfeeding No   BMI 25.81 kg/m²   General:   alert, comfortable and no distress   Heart: regular rate and rhythm   Lungs: clear to auscultation bilaterally   Breast: Inspection negative, no masses, retractions, nipple discharge or axillary adenopathy   Neck: Supple, no thyromegaly   Abdomen: Soft, nontender     No guarding/rebound   Pelvis: External genitalia: normal general appearance  Vaginal: normal mucosa without prolapse or lesions  Cervix: normal appearance and IUD string visualized  Adnexa: no adnexal masses or tenderness  Uterus: normal, nontender   Extremities: Extremities normal, atraumatic, no cyanosis or edema   Neurologic: Alert and oriented   Psychiatric: Normal affect, judgement, and mood     Assessment/Plan   Radha was seen today for gynecologic exam.    Diagnoses and all orders for this visit:    Encounter for gynecological examination    Screening examination for STD (sexually transmitted disease)  -     Hepatitis B Surface Antigen  -     Chlamydia trachomatis, Neisseria gonorrhoeae, Trichomonas vaginalis, PCR - Swab, Vagina  -     RPR  -     HIV-1 / O / 2 Ag / Antibody 4th Generation  -     Hepatitis C Antibody    Screening for breast cancer  -     Mammo Screening Digital Tomosynthesis Bilateral With CAD; Future        All questions answered.  Breast self exam technique reviewed and patient encouraged to perform self-exam monthly.  Discussed healthy lifestyle modifications.  Recommended 30 minutes of aerobic exercise five times per week.  Pap deferred since " negative pap and hpv last year, pt agrees    Discussed baseline mammogram and pt desires to proceed

## 2019-12-19 LAB
HBV SURFACE AG SERPL QL IA: NEGATIVE
HCV AB S/CO SERPL IA: <0.1 S/CO RATIO (ref 0–0.9)
HIV 1+2 AB+HIV1 P24 AG SERPL QL IA: NON REACTIVE
RPR SER QL: NORMAL

## 2019-12-20 ENCOUNTER — TELEPHONE (OUTPATIENT)
Dept: OBSTETRICS AND GYNECOLOGY | Age: 34
End: 2019-12-20

## 2019-12-20 NOTE — TELEPHONE ENCOUNTER
----- Message from Mya Lovelace MD sent at 12/20/2019  7:59 AM EST -----  Please call Radha, her blood tests for hepatitis, HIV and syphilis are all negative

## 2019-12-21 LAB
C TRACH RRNA SPEC QL NAA+PROBE: NEGATIVE
N GONORRHOEA RRNA SPEC QL NAA+PROBE: NEGATIVE
T VAGINALIS DNA SPEC QL NAA+PROBE: NEGATIVE

## 2019-12-23 ENCOUNTER — TELEPHONE (OUTPATIENT)
Dept: OBSTETRICS AND GYNECOLOGY | Age: 34
End: 2019-12-23

## 2019-12-23 NOTE — TELEPHONE ENCOUNTER
----- Message from Mya Lovelace MD sent at 12/23/2019  7:37 AM EST -----  Please call Radha, her cervical tests for CZ/GC and trich are all negative/normal

## 2020-02-28 ENCOUNTER — TELEPHONE (OUTPATIENT)
Dept: OBSTETRICS AND GYNECOLOGY | Age: 35
End: 2020-02-28

## 2020-03-17 ENCOUNTER — APPOINTMENT (OUTPATIENT)
Dept: WOMENS IMAGING | Facility: HOSPITAL | Age: 35
End: 2020-03-17

## 2020-03-17 PROCEDURE — 77063 BREAST TOMOSYNTHESIS BI: CPT | Performed by: RADIOLOGY

## 2020-03-17 PROCEDURE — 77067 SCR MAMMO BI INCL CAD: CPT | Performed by: RADIOLOGY

## 2020-03-18 ENCOUNTER — TELEPHONE (OUTPATIENT)
Dept: OBSTETRICS AND GYNECOLOGY | Age: 35
End: 2020-03-18

## 2020-03-18 DIAGNOSIS — R92.8 ABNORMAL FINDING ON BREAST IMAGING: Primary | ICD-10-CM

## 2020-03-18 NOTE — TELEPHONE ENCOUNTER
Called pt and discussed imaging results. Advised she needs additional imaging with u/s and mammogram of right breast. She understands. Orders placed and pt plans to call and book today. She notes she has recently had some pain in this breast as well. Recommend she schedule f/u visit if pain persists and imaging is normal. Pt will call to review imaging when completed.

## 2020-03-31 ENCOUNTER — TELEPHONE (OUTPATIENT)
Dept: OBSTETRICS AND GYNECOLOGY | Age: 35
End: 2020-03-31

## 2020-03-31 ENCOUNTER — APPOINTMENT (OUTPATIENT)
Dept: WOMENS IMAGING | Facility: HOSPITAL | Age: 35
End: 2020-03-31

## 2020-03-31 PROCEDURE — 77061 BREAST TOMOSYNTHESIS UNI: CPT | Performed by: RADIOLOGY

## 2020-03-31 PROCEDURE — 76641 ULTRASOUND BREAST COMPLETE: CPT | Performed by: RADIOLOGY

## 2020-03-31 PROCEDURE — 77065 DX MAMMO INCL CAD UNI: CPT | Performed by: RADIOLOGY

## 2020-03-31 PROCEDURE — G0279 TOMOSYNTHESIS, MAMMO: HCPCS | Performed by: RADIOLOGY

## 2020-04-07 ENCOUNTER — TELEHEALTH PROVIDED OTHER THAN IN PATIENT'S HOME (OUTPATIENT)
Dept: URBAN - METROPOLITAN AREA TELEHEALTH 5 | Facility: TELEHEALTH | Age: 35
End: 2020-04-07

## 2020-04-07 DIAGNOSIS — Z86.010 PERSONAL HISTORY OF COLONIC POLYPS: ICD-10-CM

## 2020-04-07 DIAGNOSIS — R19.7 DIARRHEA, UNSPECIFIED: ICD-10-CM

## 2020-04-07 DIAGNOSIS — K62.5 HEMORRHAGE OF ANUS AND RECTUM: ICD-10-CM

## 2020-04-07 PROCEDURE — 99441: CPT | Performed by: INTERNAL MEDICINE

## 2020-09-08 ENCOUNTER — LAB REQUISITION (OUTPATIENT)
Dept: LAB | Facility: HOSPITAL | Age: 35
End: 2020-09-08

## 2020-09-08 DIAGNOSIS — Z00.00 ENCOUNTER FOR GENERAL ADULT MEDICAL EXAMINATION WITHOUT ABNORMAL FINDINGS: ICD-10-CM

## 2020-09-08 PROCEDURE — U0004 COV-19 TEST NON-CDC HGH THRU: HCPCS | Performed by: INTERNAL MEDICINE

## 2020-09-09 LAB — SARS-COV-2 RNA RESP QL NAA+PROBE: NOT DETECTED

## 2020-09-14 ENCOUNTER — AMBULATORY SURGICAL CENTER (OUTPATIENT)
Dept: URBAN - METROPOLITAN AREA SURGERY 20 | Facility: SURGERY | Age: 35
End: 2020-09-14

## 2020-09-14 ENCOUNTER — OFFICE (OUTPATIENT)
Dept: URBAN - METROPOLITAN AREA PATHOLOGY 4 | Facility: PATHOLOGY | Age: 35
End: 2020-09-14

## 2020-09-14 DIAGNOSIS — K63.5 POLYP OF COLON: ICD-10-CM

## 2020-09-14 DIAGNOSIS — K62.5 HEMORRHAGE OF ANUS AND RECTUM: ICD-10-CM

## 2020-09-14 DIAGNOSIS — K62.1 RECTAL POLYP: ICD-10-CM

## 2020-09-14 DIAGNOSIS — D12.4 BENIGN NEOPLASM OF DESCENDING COLON: ICD-10-CM

## 2020-09-14 DIAGNOSIS — Z86.010 PERSONAL HISTORY OF COLONIC POLYPS: ICD-10-CM

## 2020-09-14 DIAGNOSIS — D12.7 BENIGN NEOPLASM OF RECTOSIGMOID JUNCTION: ICD-10-CM

## 2020-09-14 LAB
GI HISTOLOGY: A. SELECT: (no result)
GI HISTOLOGY: B. SELECT: (no result)
GI HISTOLOGY: C. SELECT: (no result)
GI HISTOLOGY: PDF REPORT: (no result)

## 2020-09-14 PROCEDURE — 88305 TISSUE EXAM BY PATHOLOGIST: CPT | Performed by: INTERNAL MEDICINE

## 2020-09-14 PROCEDURE — 45380 COLONOSCOPY AND BIOPSY: CPT | Mod: 33,59 | Performed by: INTERNAL MEDICINE

## 2020-09-14 PROCEDURE — 45380 COLONOSCOPY AND BIOPSY: CPT | Mod: 59,33 | Performed by: INTERNAL MEDICINE

## 2020-09-14 PROCEDURE — 45385 COLONOSCOPY W/LESION REMOVAL: CPT | Performed by: INTERNAL MEDICINE

## 2021-03-11 ENCOUNTER — LAB REQUISITION (OUTPATIENT)
Dept: LAB | Facility: HOSPITAL | Age: 36
End: 2021-03-11

## 2021-03-11 DIAGNOSIS — Z00.00 ENCOUNTER FOR GENERAL ADULT MEDICAL EXAMINATION WITHOUT ABNORMAL FINDINGS: ICD-10-CM

## 2021-03-11 PROCEDURE — U0004 COV-19 TEST NON-CDC HGH THRU: HCPCS | Performed by: INTERNAL MEDICINE

## 2021-03-12 LAB — SARS-COV-2 ORF1AB RESP QL NAA+PROBE: NOT DETECTED

## 2021-03-16 ENCOUNTER — OFFICE (OUTPATIENT)
Dept: URBAN - METROPOLITAN AREA PATHOLOGY 4 | Facility: PATHOLOGY | Age: 36
End: 2021-03-16
Payer: COMMERCIAL

## 2021-03-16 ENCOUNTER — AMBULATORY SURGICAL CENTER (OUTPATIENT)
Dept: URBAN - METROPOLITAN AREA SURGERY 20 | Facility: SURGERY | Age: 36
End: 2021-03-16
Payer: COMMERCIAL

## 2021-03-16 DIAGNOSIS — R11.2 NAUSEA WITH VOMITING, UNSPECIFIED: ICD-10-CM

## 2021-03-16 DIAGNOSIS — K20.80 OTHER ESOPHAGITIS WITHOUT BLEEDING: ICD-10-CM

## 2021-03-16 DIAGNOSIS — K29.70 GASTRITIS, UNSPECIFIED, WITHOUT BLEEDING: ICD-10-CM

## 2021-03-16 DIAGNOSIS — K21.00 GASTRO-ESOPHAGEAL REFLUX DISEASE WITH ESOPHAGITIS, WITHOUT B: ICD-10-CM

## 2021-03-16 DIAGNOSIS — K29.80 DUODENITIS WITHOUT BLEEDING: ICD-10-CM

## 2021-03-16 DIAGNOSIS — K92.1 MELENA: ICD-10-CM

## 2021-03-16 DIAGNOSIS — K29.50 UNSPECIFIED CHRONIC GASTRITIS WITHOUT BLEEDING: ICD-10-CM

## 2021-03-16 PROBLEM — K31.89 OTHER DISEASES OF STOMACH AND DUODENUM: Status: ACTIVE | Noted: 2021-03-16

## 2021-03-16 PROCEDURE — 43239 EGD BIOPSY SINGLE/MULTIPLE: CPT | Performed by: INTERNAL MEDICINE

## 2021-03-16 PROCEDURE — 88305 TISSUE EXAM BY PATHOLOGIST: CPT | Performed by: INTERNAL MEDICINE

## 2021-03-16 RX ORDER — LANSOPRAZOLE 30 MG/1
30 CAPSULE, DELAYED RELEASE PELLETS ORAL
Qty: 30 | Refills: 11 | Status: COMPLETED
Start: 2021-03-16 | End: 2024-08-07

## 2021-03-16 NOTE — SERVICEHPINOTES
reflux, nausea in the morning  some weight gain, bid Pepcid helpful but seemed to be less so after a few days.  mother had barretts esophagus , has had black stools not able to get labs yet.

## 2021-03-18 ENCOUNTER — OFFICE VISIT (OUTPATIENT)
Dept: OBSTETRICS AND GYNECOLOGY | Age: 36
End: 2021-03-18

## 2021-03-18 VITALS
BODY MASS INDEX: 29.19 KG/M2 | HEIGHT: 67 IN | WEIGHT: 186 LBS | SYSTOLIC BLOOD PRESSURE: 110 MMHG | DIASTOLIC BLOOD PRESSURE: 70 MMHG

## 2021-03-18 DIAGNOSIS — Z01.419 WELL WOMAN EXAM WITH ROUTINE GYNECOLOGICAL EXAM: Primary | ICD-10-CM

## 2021-03-18 DIAGNOSIS — Z12.4 SCREENING FOR CERVICAL CANCER: ICD-10-CM

## 2021-03-18 DIAGNOSIS — Z11.3 SCREEN FOR STD (SEXUALLY TRANSMITTED DISEASE): ICD-10-CM

## 2021-03-18 DIAGNOSIS — R10.2 PELVIC PAIN: ICD-10-CM

## 2021-03-18 PROCEDURE — 99395 PREV VISIT EST AGE 18-39: CPT | Performed by: NURSE PRACTITIONER

## 2021-03-18 RX ORDER — LANSOPRAZOLE 30 MG/1
CAPSULE, DELAYED RELEASE ORAL
COMMUNITY
Start: 2021-03-16

## 2021-03-18 RX ORDER — FAMOTIDINE 40 MG/1
40 TABLET, FILM COATED ORAL 2 TIMES DAILY
COMMUNITY
Start: 2021-03-05

## 2021-03-18 RX ORDER — ESCITALOPRAM OXALATE 20 MG/1
20 TABLET ORAL DAILY
COMMUNITY

## 2021-03-18 NOTE — PROGRESS NOTES
Subjective     Chief Complaint   Patient presents with   • Gynecologic Exam     AE, last pap 2018 neg/hpv neg, csy 2020, irregular bleeding, cramping from abdominal pains       History of Present Illness    Radha Ferrer is a 35 y.o.  who presents for annual exam.    Doing well  Will update pap smear today  Has Mirena IUD for contraception  Periods are irregular   Having lower abdominal pain x 6 months  Has seen GI  Had colonoscopy in august and found 5 polyps per patient  Had sepsis in august as well from a kidney infection  PCP - Rosaline Scott   In monogamous relationship x 8 months but wants to have std screening today  Would also like serum testing done today  Pt of Dr. Lovelace      Obstetric History:  OB History        1    Para   1    Term   1       0    AB   0    Living   1       SAB   0    TAB   0    Ectopic   0    Molar        Multiple   0    Live Births                   Menstrual History:     No LMP recorded.         Current contraception: IUD  History of abnormal Pap smear: no  Received Gardasil immunization: yes  Perform regular self breast exam: yes - regularly  Family history of uterine or ovarian cancer: no  Family History of colon cancer: yes - distant relatives   Family history of breast cancer: no    Mammogram: not indicated.  Colonoscopy: up to date.  DEXA: not indicated.    Exercise: not active  Calcium/Vitamin D: adequate intake    The following portions of the patient's history were reviewed and updated as appropriate: allergies, current medications, past family history, past medical history, past social history, past surgical history and problem list.    Review of Systems   Constitutional: Negative.    Respiratory: Negative.    Cardiovascular: Negative.    Gastrointestinal: Negative.    Genitourinary: Positive for pelvic pain.   Skin: Negative.    Psychiatric/Behavioral: Negative.            Objective   Physical Exam  Constitutional:       General: She is awake.       Appearance: Normal appearance. She is well-developed.   HENT:      Head: Normocephalic and atraumatic.      Nose: Nose normal.   Neck:      Thyroid: No thyroid mass, thyromegaly or thyroid tenderness.   Cardiovascular:      Rate and Rhythm: Normal rate and regular rhythm.      Pulses: Normal pulses.      Heart sounds: Normal heart sounds.   Pulmonary:      Effort: Pulmonary effort is normal.      Breath sounds: Normal breath sounds.   Chest:      Breasts: Breasts are symmetrical.         Right: Normal. No swelling, bleeding, inverted nipple, mass, nipple discharge, skin change or tenderness.         Left: Normal. No swelling, bleeding, inverted nipple, mass, nipple discharge, skin change or tenderness.   Abdominal:      General: Abdomen is flat. Bowel sounds are normal.      Palpations: Abdomen is soft.      Tenderness: There is no abdominal tenderness.   Genitourinary:     General: Normal vulva.      Labia:         Right: No rash, tenderness, lesion or injury.         Left: No rash, tenderness, lesion or injury.       Urethra: No prolapse, urethral pain, urethral swelling or urethral lesion.      Vagina: Normal. No signs of injury. No vaginal discharge, erythema, tenderness, bleeding, lesions or prolapsed vaginal walls.      Cervix: No discharge, friability, lesion, erythema or cervical bleeding.      Uterus: Normal. Tender. Not enlarged and no uterine prolapse.       Adnexa: Right adnexa normal and left adnexa normal.        Right: No mass, tenderness or fullness.          Left: No mass, tenderness or fullness.        Rectum: Normal. No mass.      Comments: IUD strings noted at os   Musculoskeletal:      Cervical back: Normal range of motion and neck supple.   Lymphadenopathy:      Upper Body:      Right upper body: No supraclavicular adenopathy.      Left upper body: No supraclavicular adenopathy.   Skin:     General: Skin is warm and dry.   Neurological:      General: No focal deficit present.      Mental  "Status: She is alert and oriented to person, place, and time.   Psychiatric:         Mood and Affect: Mood normal.         Behavior: Behavior normal. Behavior is cooperative.         Thought Content: Thought content normal.         Judgment: Judgment normal.         /70   Ht 170.2 cm (67\")   Wt 84.4 kg (186 lb)   BMI 29.13 kg/m²     Assessment/Plan   Diagnoses and all orders for this visit:    1. Well woman exam with routine gynecological exam (Primary)  -     IGP,CtNgTv,Apt HPV,rfx 16 / 18,45    2. Screen for STD (sexually transmitted disease)  -     RPR, Rfx Qn RPR / Confirm TP  -     Hepatitis B Surface Antigen  -     Hepatitis C Antibody  -     HIV-1 / O / 2 Ag / Antibody 4th Generation    3. Screening for cervical cancer  -     IGP,CtNgTv,Apt HPV,rfx 16 / 18,45    4. Pelvic pain        All questions answered.  Breast self exam technique reviewed and patient encouraged to perform self-exam monthly.  Discussed healthy lifestyle modifications.  Recommended 30 minutes of aerobic exercise five times per week.  Discussed calcium and vitamin D needs to prevent osteoporosis.    -Pap and std screening today  -F/u in next 1-2 weeks with Dr. Lovelace with US to further evaluated pelvic pain                 "

## 2021-03-19 LAB
HBV SURFACE AG SERPL QL IA: NEGATIVE
HCV AB S/CO SERPL IA: <0.1 S/CO RATIO (ref 0–0.9)
HIV 1+2 AB+HIV1 P24 AG SERPL QL IA: NON REACTIVE
RPR SER QL: NON REACTIVE

## 2021-03-24 ENCOUNTER — OFFICE (OUTPATIENT)
Dept: URBAN - METROPOLITAN AREA CLINIC 66 | Facility: CLINIC | Age: 36
End: 2021-03-24

## 2021-03-24 LAB
C TRACH RRNA CVX QL NAA+PROBE: NEGATIVE
CYTOLOGIST CVX/VAG CYTO: ABNORMAL
CYTOLOGY CVX/VAG DOC CYTO: ABNORMAL
CYTOLOGY CVX/VAG DOC THIN PREP: ABNORMAL
DX ICD CODE: ABNORMAL
HIV 1 & 2 AB SER-IMP: ABNORMAL
HPV I/H RISK 4 DNA CVX QL PROBE+SIG AMP: POSITIVE
HPV16 DNA CVX QL PROBE+SIG AMP: NEGATIVE
HPV18+45 E6+E7 MRNA CVX QL NAA+PROBE: NEGATIVE
N GONORRHOEA RRNA CVX QL NAA+PROBE: NEGATIVE
OTHER STN SPEC: ABNORMAL
STAT OF ADQ CVX/VAG CYTO-IMP: ABNORMAL
T VAGINALIS RRNA SPEC QL NAA+PROBE: NEGATIVE

## 2021-03-25 PROBLEM — R87.810 CERVICAL HIGH RISK HPV (HUMAN PAPILLOMAVIRUS) TEST POSITIVE: Status: ACTIVE | Noted: 2021-03-25

## 2021-04-06 ENCOUNTER — OFFICE VISIT (OUTPATIENT)
Dept: OBSTETRICS AND GYNECOLOGY | Age: 36
End: 2021-04-06

## 2021-04-06 VITALS
SYSTOLIC BLOOD PRESSURE: 110 MMHG | DIASTOLIC BLOOD PRESSURE: 70 MMHG | WEIGHT: 186.4 LBS | BODY MASS INDEX: 29.26 KG/M2 | HEIGHT: 67 IN

## 2021-04-06 DIAGNOSIS — N83.209 CYST OF OVARY, UNSPECIFIED LATERALITY: ICD-10-CM

## 2021-04-06 DIAGNOSIS — R87.810 CERVICAL HIGH RISK HPV (HUMAN PAPILLOMAVIRUS) TEST POSITIVE: ICD-10-CM

## 2021-04-06 DIAGNOSIS — R10.2 PELVIC PAIN: Primary | ICD-10-CM

## 2021-04-06 PROCEDURE — 99213 OFFICE O/P EST LOW 20 MIN: CPT | Performed by: OBSTETRICS & GYNECOLOGY

## 2021-04-06 NOTE — PROGRESS NOTES
"Chief complaint:pelvic pain    HPI  Radha Ferrer is a 35 y.o. female presents for u/s evaluation for pelvic pain. She notes the pain started back in October. She reports the pain is daily and periumbilical. It feels like a pressure sensation. She has daily diarrhea which is chronic. She denies nausea or fevers associated with pain. She did have a colonoscopy in the summer with polyps noted. Her GI MD, Dr. Ayala, continues to follow her regularly.         The following portions of the patient's history were reviewed and updated as appropriate: allergies, current medications, past family history, past medical history, past social history, past surgical history and problem list.    Review of Systems  Constitutional: negative for fevers  Gastrointestinal: positive for diarrhea, negative for nausea  Genitourinary:positive for daily mid-pelvic pain, negative for irregular bleeding    /70   Ht 170.2 cm (67\")   Wt 84.6 kg (186 lb 6.4 oz)   LMP 03/13/2021 (Approximate)   Breastfeeding No   BMI 29.19 kg/m²         Physical Exam  Constitutional:       Appearance: Normal appearance.   Pulmonary:      Effort: Pulmonary effort is normal.   Abdominal:      General: Bowel sounds are normal. There is no distension.      Palpations: Abdomen is soft.      Tenderness: There is no abdominal tenderness. There is no guarding.   Neurological:      General: No focal deficit present.      Mental Status: She is alert and oriented to person, place, and time.   Psychiatric:         Mood and Affect: Mood normal.         Behavior: Behavior normal.     tv u/s: normal uterus with IUD, 3.5 cm right ovarian cyst, normal left ovary, no free fluid        Diagnoses and all orders for this visit:    1. Pelvic pain (Primary)    2. Cervical high risk HPV (human papillomavirus) test positive      Reviewed u/s with pt. Small right ovarian cyst is unlikely source of described pain and pt agrees. She denies any pain during her recent pelvic exam or " during u/s. Recommend f/u u/s to re-evaluate cyst and pt agrees. Discussed other potential sources of pain and pt plans to f/u with her primary MD for further evauation    Discussed positive HPV results. Plan repeat pap 1 year. Questions answered and pt agrees with plan.     25 minutes reviewing chart or with patient

## 2021-04-16 ENCOUNTER — BULK ORDERING (OUTPATIENT)
Dept: CASE MANAGEMENT | Facility: OTHER | Age: 36
End: 2021-04-16

## 2021-04-16 DIAGNOSIS — Z23 IMMUNIZATION DUE: ICD-10-CM

## 2022-10-11 ENCOUNTER — OFFICE VISIT (OUTPATIENT)
Dept: OBSTETRICS AND GYNECOLOGY | Age: 37
End: 2022-10-11

## 2022-10-11 VITALS
SYSTOLIC BLOOD PRESSURE: 122 MMHG | HEIGHT: 67 IN | DIASTOLIC BLOOD PRESSURE: 80 MMHG | WEIGHT: 183.6 LBS | BODY MASS INDEX: 28.82 KG/M2

## 2022-10-11 DIAGNOSIS — N92.6 IRREGULAR BLEEDING: ICD-10-CM

## 2022-10-11 DIAGNOSIS — Z30.431 IUD CHECK UP: ICD-10-CM

## 2022-10-11 PROBLEM — N83.209 CYST OF OVARY, UNSPECIFIED LATERALITY: Status: RESOLVED | Noted: 2021-04-06 | Resolved: 2022-10-11

## 2022-10-11 LAB
B-HCG UR QL: NEGATIVE
EXPIRATION DATE: NORMAL
INTERNAL NEGATIVE CONTROL: NEGATIVE
INTERNAL POSITIVE CONTROL: POSITIVE
Lab: NORMAL

## 2022-10-11 PROCEDURE — 81025 URINE PREGNANCY TEST: CPT | Performed by: OBSTETRICS & GYNECOLOGY

## 2022-10-11 PROCEDURE — 76830 TRANSVAGINAL US NON-OB: CPT | Performed by: OBSTETRICS & GYNECOLOGY

## 2022-10-11 PROCEDURE — 99213 OFFICE O/P EST LOW 20 MIN: CPT | Performed by: OBSTETRICS & GYNECOLOGY

## 2022-10-11 RX ORDER — DIAZEPAM 2 MG/1
0.5 TABLET ORAL
Qty: 7 TABLET | Refills: 0 | COMMUNITY
Start: 2022-09-27 | End: 2022-10-25

## 2022-10-11 NOTE — PROGRESS NOTES
"Chief Complaint   Patient presents with   • Follow-up     GYN F/U for Irregular bleeding with IUD, Pt states she has been spotting on and off for a couple months, bloating, and pain in her right side         HPI  Radha Vincent is a 37 y.o. female presents for evaluation of irregular bleeding. She also has right sided pain. She notes bleeding is light but intermittent. She has kyleena placed by GYN Onc in 4/2019.        The following portions of the patient's history were reviewed and updated as appropriate: allergies, current medications, past family history, past medical history, past social history, past surgical history and problem list.    Review of Systems  Pertinent items are noted in HPI.    /80   Ht 170.2 cm (67\")   Wt 83.3 kg (183 lb 9.6 oz)   LMP 09/20/2022 (Exact Date)   BMI 28.76 kg/m²         Physical Exam  Constitutional:       Appearance: Normal appearance.   Pulmonary:      Effort: Pulmonary effort is normal.   Genitourinary:     Comments: Normal vagina and cervix. IUD strings noted. Mild uterine tenderness with palpation. No CMT. No adnexal masses/tenderness.   Neurological:      General: No focal deficit present.      Mental Status: She is alert and oriented to person, place, and time.   Psychiatric:         Mood and Affect: Mood normal.         Behavior: Behavior normal.     tv u/s: normal uterus, endometrium not well imaged and IUD position within cavity not confirmed. Normal ovaries noted, right with several follicles, one suggestive of resolving corpus luteum.         Diagnoses and all orders for this visit:    1. Irregular bleeding  -     US Non-ob Transvaginal  -     POC Pregnancy, Urine  -     Chlamydia trachomatis, Neisseria gonorrhoeae, Trichomonas vaginalis, PCR - Urine, Urine, Clean Catch    2. IUD check up  -     US Non-ob Transvaginal      Discussed u/s findings with pt. Ultrasound images do not confirm IUD is within endometrium. Recommended pt consider removal. She " declines at this time and prefers f/u imaging to assess further. Will schedule f/u imaging per pt request.     Discussed possible resolving corpus luteum right adnexa may explain her right sided discomfort. Will observe.

## 2022-10-12 LAB
C TRACH RRNA SPEC QL NAA+PROBE: NEGATIVE
N GONORRHOEA RRNA SPEC QL NAA+PROBE: NEGATIVE
T VAGINALIS RRNA SPEC QL NAA+PROBE: NEGATIVE

## 2022-10-31 ENCOUNTER — OFFICE VISIT (OUTPATIENT)
Dept: OBSTETRICS AND GYNECOLOGY | Age: 37
End: 2022-10-31

## 2022-10-31 VITALS
BODY MASS INDEX: 29 KG/M2 | HEIGHT: 67 IN | WEIGHT: 184.8 LBS | SYSTOLIC BLOOD PRESSURE: 124 MMHG | DIASTOLIC BLOOD PRESSURE: 86 MMHG

## 2022-10-31 DIAGNOSIS — R87.810 CERVICAL HIGH RISK HPV (HUMAN PAPILLOMAVIRUS) TEST POSITIVE: ICD-10-CM

## 2022-10-31 DIAGNOSIS — Z30.431 IUD CHECK UP: ICD-10-CM

## 2022-10-31 DIAGNOSIS — Z01.419 ENCOUNTER FOR GYNECOLOGICAL EXAMINATION: Primary | ICD-10-CM

## 2022-10-31 DIAGNOSIS — Z11.51 ENCOUNTER FOR SCREENING FOR HUMAN PAPILLOMAVIRUS (HPV): ICD-10-CM

## 2022-10-31 PROBLEM — G93.0 ARACHNOID CYST: Status: ACTIVE | Noted: 2021-09-02

## 2022-10-31 PROCEDURE — 99395 PREV VISIT EST AGE 18-39: CPT | Performed by: OBSTETRICS & GYNECOLOGY

## 2022-10-31 NOTE — PROGRESS NOTES
.  Subjective     Chief Complaint   Patient presents with   • Follow-up     GYN F/U for IUD placement check & Pap, U/S today, Pt has no complaints today        History of Present Illness    Radha Vincent is a 37 y.o.  who presents for annual exam.  She does not have menses with IUD but has intermittent spotting.     Pt notes she has a nevus on her right nipple. She saw her dermatologist and no further evaluation advised.     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 She has weaned off completely and is feeling much better now.     Obstetric History:  OB History        1    Para   1    Term   1       0    AB   0    Living   1       SAB   0    IAB   0    Ectopic   0    Molar        Multiple        Live Births   1               Menstrual History:     No LMP recorded (lmp unknown).         Current contraception: IUD-kyleena placed   History of abnormal Pap smear: had HPV last year  Received Gardasil immunization: yes  Perform regular self breast exam: yes  Family history of uterine or ovarian cancer: no  Family History of colon cancer: yes - mat great uncles, pt has had polyps  Family history of breast cancer: yes - mat gr GM    Mammogram: not indicated.  Colonoscopy: managed by GI MD  DEXA: not indicated.    Exercise: moderately active  Calcium/Vitamin D: adequate intake    The following portions of the patient's history were reviewed and updated as appropriate: allergies, current medications, past family history, past medical history, past social history, past surgical history and problem list.    Review of Systems    Review of Systems   Constitutional: Negative for fatigue.   Respiratory: Negative for shortness of breath.    Gastrointestinal: Negative for abdominal pain.   Genitourinary: Positive for irregular light bleeding  Neurological: Negative for headaches.   Psychiatric/Behavioral: Negative for dysphoric mood.         Objective   Physical  "Exam    /86   Ht 170.2 cm (67\")   Wt 83.8 kg (184 lb 12.8 oz)   LMP  (LMP Unknown)   BMI 28.94 kg/m²   General:   Alert, in no distress   Heart: regular rate and rhythm   Lungs: clear to auscultation bilaterally   Breast: Inspection with pinpoint nevus on right nipple; no masses, retractions, nipple discharge or axillary adenopathy in either breast.    Neck: supple   Abdomen: Soft, no tenderness or guarding   Pelvis: External genitalia: normal general appearance  Urinary system: urethral meatus normal  Vaginal: normal mucosa without prolapse or lesions  Cervix: normal appearance and IUD string visualized  Adnexa: no masses or tenderness  Uterus: normal, nontender   Extremities: Normal without edema   Neurologic: Alert and oriented   Psychiatric: Normal affect, judgment and mood     tv u/s: normal uterus, IUD within endometrial cavity. Normal ovaries with small follicles    Assessment & Plan   Diagnoses and all orders for this visit:    1. Encounter for gynecological examination (Primary)  -     IGP, Apt HPV,rfx 16 / 18,45    2. Encounter for screening for human papillomavirus (HPV)  -     IGP, Apt HPV,rfx 16 / 18,45    3. Cervical high risk HPV (human papillomavirus) test positive        All questions answered.  Breast self exam technique reviewed and patient encouraged to perform self-exam monthly.  Discussed healthy lifestyle modifications.  Recommended 30 minutes of aerobic exercise five times per week.  Advised pt to call if she does not receive results of pap within 2 weeks. Will make colpo appt for 3 weeks as indicated if pap or HPV abnormal.     U/s imaging today confirms IUD in cavity. Pt desires to leave until it is due to be replaced 2014             "

## 2022-11-04 LAB
CYTOLOGIST CVX/VAG CYTO: NORMAL
CYTOLOGY CVX/VAG DOC CYTO: NORMAL
CYTOLOGY CVX/VAG DOC THIN PREP: NORMAL
DX ICD CODE: NORMAL
HIV 1 & 2 AB SER-IMP: NORMAL
HPV I/H RISK 4 DNA CVX QL PROBE+SIG AMP: NEGATIVE
OTHER STN SPEC: NORMAL
STAT OF ADQ CVX/VAG CYTO-IMP: NORMAL

## 2024-01-12 ENCOUNTER — OFFICE VISIT (OUTPATIENT)
Dept: OBSTETRICS AND GYNECOLOGY | Age: 39
End: 2024-01-12
Payer: COMMERCIAL

## 2024-01-12 VITALS
DIASTOLIC BLOOD PRESSURE: 80 MMHG | BODY MASS INDEX: 28.85 KG/M2 | SYSTOLIC BLOOD PRESSURE: 120 MMHG | WEIGHT: 183.8 LBS | HEIGHT: 67 IN

## 2024-01-12 DIAGNOSIS — Z01.419 ENCOUNTER FOR GYNECOLOGICAL EXAMINATION: Primary | ICD-10-CM

## 2024-01-12 DIAGNOSIS — Z80.9 FAMILY HISTORY OF CANCER: ICD-10-CM

## 2024-01-12 DIAGNOSIS — Z12.31 ENCOUNTER FOR SCREENING MAMMOGRAM FOR MALIGNANT NEOPLASM OF BREAST: ICD-10-CM

## 2024-01-12 DIAGNOSIS — Z11.51 ENCOUNTER FOR SCREENING FOR HUMAN PAPILLOMAVIRUS (HPV): ICD-10-CM

## 2024-01-12 PROBLEM — Z86.19 HISTORY OF HPV INFECTION: Status: ACTIVE | Noted: 2024-01-12

## 2024-01-12 PROBLEM — R87.810 CERVICAL HIGH RISK HPV (HUMAN PAPILLOMAVIRUS) TEST POSITIVE: Status: RESOLVED | Noted: 2021-03-25 | Resolved: 2024-01-12

## 2024-01-12 NOTE — PROGRESS NOTES
".Subjective     Chief Complaint   Patient presents with    Gynecologic Exam     Annual exam, Last pap 10/31/2022 NEG, HPV NEG, Pt has no complaints today, Doing well        History of Present Illness    Radha Vincent is a 38 y.o.  who presents for annual exam.  Her menses are regular every 28-30 days, lasting 0-3 days, dysmenorrhea none     She notes Lauren is now 6 yo and doing well.     Obstetric History:  OB History          1    Para   1    Term   1       0    AB   0    Living   1         SAB   0    IAB   0    Ectopic   0    Molar        Multiple        Live Births   1               Menstrual History:     Patient's last menstrual period was 01/10/2024 (exact date).         Current contraception: IUD-kyleena inserted   History of abnormal Pap smear:  had HPV in past  Received Gardasil immunization: yes  Perform regular self breast exam:  yes  Family history of uterine or ovarian cancer: no  Family History of colon cancer: yes - mat great uncles  Family history of breast cancer: yes - mat great GM    Mammogram: ordered.  Colonoscopy: pt reports she is managed by Dr. Ayala, her GI MD, she believes she is up to date  DEXA: not indicated.    Exercise: moderately active  Calcium/Vitamin D: adequate intake    The following portions of the patient's history were reviewed and updated as appropriate: allergies, current medications, past family history, past medical history, past social history, past surgical history, and problem list.    Review of Systems    Review of Systems   Constitutional: Negative for fatigue.   Respiratory: Negative for shortness of breath.    Gastrointestinal: Negative for abdominal pain.   Genitourinary: Negative for abnormal or heavy bleeding  Neurological: Negative for severer headaches.   Psychiatric/Behavioral: Negative for dysphoric mood.         Objective   Physical Exam    /80   Ht 170.2 cm (67\")   Wt 83.4 kg (183 lb 12.8 oz)   LMP 01/10/2024 (Exact Date) "   BMI 28.79 kg/m²   General:   Alert, in no distress   Heart: regular rate and rhythm   Lungs: clear to auscultation bilaterally   Breast: Inspection is negative. Left breast is without masses, retractions, nipple discharge or axillary adenopathy. Right breast is without masses, retractions, nipple discharge or axillary adenopathy.     Neck: Supple, no thyromegaly   Abdomen: Soft, no tenderness or guarding   Pelvis: External genitalia: normal general appearance  Urinary system: urethral meatus normal  Vaginal: normal mucosa without prolapse or lesions  Cervix: normal appearance and IUD string visualized  Adnexa: no masses or tenderness  Uterus: normal, nontender   Extremities: Normal without edema   Neurologic: Alert and oriented   Psychiatric: Normal affect, judgment and mood     Assessment & Plan   Diagnoses and all orders for this visit:    1. Encounter for gynecological examination (Primary)  -     IGP, Apt HPV,rfx 16 / 18,45    2. Encounter for screening for human papillomavirus (HPV)  -     IGP, Apt HPV,rfx 16 / 18,45    3. Encounter for screening mammogram for malignant neoplasm of breast  -     Mammo Screening Digital Tomosynthesis Bilateral With CAD; Future    4. Family history of cancer        All questions answered.  Breast self exam technique reviewed and patient encouraged to perform self-exam monthly.  Discussed healthy lifestyle modifications.  Recommended 30 minutes of aerobic exercise five times per week.  Advised pt to call if she does not receive results of pap within 2 weeks. Recommend she schedule mammogram and orders placed.      Advised pt call to confirm she is not due for f/u colonoscopy. Will also attempt to obtain records. Pt believes she is still up to date but will call her GI office.     Pt desires another Kyleena and will schedule prior to expiration.    Recommended [facility] genetic testing due to family hx and handout provided. Pt declines today but will consider.

## 2024-02-19 ENCOUNTER — APPOINTMENT (OUTPATIENT)
Dept: WOMENS IMAGING | Facility: HOSPITAL | Age: 39
End: 2024-02-19
Payer: COMMERCIAL

## 2024-02-19 PROCEDURE — 77067 SCR MAMMO BI INCL CAD: CPT | Performed by: RADIOLOGY

## 2024-02-19 PROCEDURE — 77063 BREAST TOMOSYNTHESIS BI: CPT | Performed by: RADIOLOGY

## 2024-02-26 DIAGNOSIS — N64.89 BREAST ASYMMETRY: ICD-10-CM

## 2024-02-26 DIAGNOSIS — R92.8 ABNORMAL MAMMOGRAM: Primary | ICD-10-CM

## 2024-02-27 ENCOUNTER — OFFICE VISIT (OUTPATIENT)
Dept: OBSTETRICS AND GYNECOLOGY | Age: 39
End: 2024-02-27
Payer: COMMERCIAL

## 2024-02-27 VITALS
BODY MASS INDEX: 28.38 KG/M2 | SYSTOLIC BLOOD PRESSURE: 118 MMHG | DIASTOLIC BLOOD PRESSURE: 80 MMHG | WEIGHT: 180.8 LBS | HEIGHT: 67 IN

## 2024-02-27 DIAGNOSIS — Z86.010 HISTORY OF COLON POLYPS: ICD-10-CM

## 2024-02-27 DIAGNOSIS — Z30.433 ENCOUNTER FOR IUD REMOVAL AND REINSERTION: Primary | ICD-10-CM

## 2024-02-27 PROBLEM — R92.8 ABNORMAL MAMMOGRAM: Status: ACTIVE | Noted: 2024-02-27

## 2024-02-27 NOTE — PROGRESS NOTES
Procedures  IUD Removal and Insertion Procedure Note    IUD Removal    Type of IUD:  Kyleena   Date of insertion:  known  Reason for removal:  Device expiration      Procedure Time Documentation  The risks of the procedure were reviewed with the patient including bleeding, infection and unlikely damage to the uterus and the benefits of the procedure were explained to the patient and Verbal informed consent was obtained. A procedural time out was performed.     Procedure Details  IUD strings visible:  yes  Local anesthesia:  None  Tenaculum used:  None  Removal:  IUD strings grasped and IUD removed intact with gentle traction.  The patient tolerated the procedure well.    IUD Insertion    Indication: Desires long acting reversible contraception     Procedure Details   Urine pregnancy test was done and was NEGATIVE .  The risks (including infection, bleeding, pain, and uterine perforation) and benefits of the procedure were explained to the patient and  verbal and written  informed consent was obtained.      Cervix cleansed with Betadine. Uterus sounded to 8 cm. IUD inserted without difficulty using sterile technique. String visible and trimmed at 3 cm    IUD Information:  Kyleena, Lot # [id number], Expiration date 2026/Jan.    Condition:  Stable    Complications:  None noted and Patient tolerated the procedure well without complications.    Plan:  The patient was advised to call for any fever or for prolonged or severe pain or bleeding.   Post IUD insertion instructions reviewed.   F/u 6 weeks if u/s is reassuring.     Also discussed GI records. Advised f/u colonoscopy may be indicated. Placed referral back to her GI MD and she agrees to book.  Also discussed recent breast imaging. Pt has f/u imaging scheduled next week. Advised her to call if she does not receive results within 5 days of imaging.     Mya Lovelace MD  2/27/2024 06:58 EST

## 2024-03-04 ENCOUNTER — APPOINTMENT (OUTPATIENT)
Dept: WOMENS IMAGING | Facility: HOSPITAL | Age: 39
End: 2024-03-04
Payer: COMMERCIAL

## 2024-03-04 PROCEDURE — 77061 BREAST TOMOSYNTHESIS UNI: CPT | Performed by: RADIOLOGY

## 2024-03-04 PROCEDURE — 76642 ULTRASOUND BREAST LIMITED: CPT | Performed by: RADIOLOGY

## 2024-03-04 PROCEDURE — 77065 DX MAMMO INCL CAD UNI: CPT | Performed by: RADIOLOGY

## 2024-03-04 PROCEDURE — G0279 TOMOSYNTHESIS, MAMMO: HCPCS | Performed by: RADIOLOGY

## 2024-03-06 DIAGNOSIS — N63.10 MASS OF RIGHT BREAST, UNSPECIFIED QUADRANT: ICD-10-CM

## 2024-03-06 DIAGNOSIS — R92.8 ABNORMAL MAMMOGRAM: Primary | ICD-10-CM

## 2024-03-06 RX ORDER — LIDOCAINE HYDROCHLORIDE AND EPINEPHRINE 10; 10 MG/ML; UG/ML
20 INJECTION, SOLUTION INFILTRATION; PERINEURAL ONCE
OUTPATIENT
Start: 2024-03-06 | End: 2024-03-06

## 2024-03-15 ENCOUNTER — APPOINTMENT (OUTPATIENT)
Dept: WOMENS IMAGING | Facility: HOSPITAL | Age: 39
End: 2024-03-15
Payer: COMMERCIAL

## 2024-03-15 PROCEDURE — 76942 ECHO GUIDE FOR BIOPSY: CPT | Performed by: RADIOLOGY

## 2024-03-15 PROCEDURE — 19000 PUNCTURE ASPIR CYST BREAST: CPT | Performed by: RADIOLOGY

## 2024-04-08 ENCOUNTER — OFFICE (OUTPATIENT)
Dept: URBAN - METROPOLITAN AREA PATHOLOGY 4 | Facility: PATHOLOGY | Age: 39
End: 2024-04-08
Payer: COMMERCIAL

## 2024-04-08 ENCOUNTER — AMBULATORY SURGICAL CENTER (OUTPATIENT)
Dept: URBAN - METROPOLITAN AREA SURGERY 20 | Facility: SURGERY | Age: 39
End: 2024-04-08

## 2024-04-08 DIAGNOSIS — K63.5 POLYP OF COLON: ICD-10-CM

## 2024-04-08 DIAGNOSIS — K64.0 FIRST DEGREE HEMORRHOIDS: ICD-10-CM

## 2024-04-08 DIAGNOSIS — Z09 ENCOUNTER FOR FOLLOW-UP EXAMINATION AFTER COMPLETED TREATMEN: ICD-10-CM

## 2024-04-08 DIAGNOSIS — K62.89 OTHER SPECIFIED DISEASES OF ANUS AND RECTUM: ICD-10-CM

## 2024-04-08 DIAGNOSIS — Z86.010 PERSONAL HISTORY OF COLONIC POLYPS: ICD-10-CM

## 2024-04-08 DIAGNOSIS — K62.1 RECTAL POLYP: ICD-10-CM

## 2024-04-08 LAB
GI HISTOLOGY: A. SIGMOID POLYP: (no result)
GI HISTOLOGY: B. RECTAL POLYP: (no result)
GI HISTOLOGY: PDF REPORT: (no result)

## 2024-04-08 PROCEDURE — 88305 TISSUE EXAM BY PATHOLOGIST: CPT | Performed by: PATHOLOGY

## 2024-04-08 PROCEDURE — 45380 COLONOSCOPY AND BIOPSY: CPT | Mod: 33 | Performed by: INTERNAL MEDICINE

## 2024-08-07 ENCOUNTER — OFFICE (OUTPATIENT)
Dept: URBAN - METROPOLITAN AREA CLINIC 76 | Facility: CLINIC | Age: 39
End: 2024-08-07
Payer: COMMERCIAL

## 2024-08-07 VITALS
SYSTOLIC BLOOD PRESSURE: 118 MMHG | SYSTOLIC BLOOD PRESSURE: 118 MMHG | WEIGHT: 174 LBS | OXYGEN SATURATION: 97 % | HEART RATE: 80 BPM | DIASTOLIC BLOOD PRESSURE: 80 MMHG | WEIGHT: 174 LBS | HEART RATE: 80 BPM | DIASTOLIC BLOOD PRESSURE: 80 MMHG | OXYGEN SATURATION: 97 % | OXYGEN SATURATION: 97 % | WEIGHT: 174 LBS | HEART RATE: 80 BPM | HEIGHT: 67 IN | SYSTOLIC BLOOD PRESSURE: 118 MMHG | DIASTOLIC BLOOD PRESSURE: 80 MMHG | HEIGHT: 67 IN | HEIGHT: 67 IN

## 2024-08-07 DIAGNOSIS — Z86.010 PERSONAL HISTORY OF COLONIC POLYPS: ICD-10-CM

## 2024-08-07 DIAGNOSIS — F45.8 OTHER SOMATOFORM DISORDERS: ICD-10-CM

## 2024-08-07 PROCEDURE — 99203 OFFICE O/P NEW LOW 30 MIN: CPT | Performed by: INTERNAL MEDICINE

## 2024-08-07 PROCEDURE — 99214 OFFICE O/P EST MOD 30 MIN: CPT | Performed by: INTERNAL MEDICINE
